# Patient Record
Sex: FEMALE | Race: WHITE
[De-identification: names, ages, dates, MRNs, and addresses within clinical notes are randomized per-mention and may not be internally consistent; named-entity substitution may affect disease eponyms.]

---

## 2018-08-14 ENCOUNTER — HOSPITAL ENCOUNTER (OUTPATIENT)
Dept: HOSPITAL 95 - LAB | Age: 68
Discharge: HOME | End: 2018-08-14
Attending: OBSTETRICS & GYNECOLOGY
Payer: MEDICARE

## 2018-08-14 DIAGNOSIS — Z91.89: Primary | ICD-10-CM

## 2018-08-14 PROCEDURE — G0123 SCREEN CERV/VAG THIN LAYER: HCPCS

## 2018-08-16 LAB — OTHER STN SPEC: (no result)

## 2020-10-19 NOTE — NUR
10/19/20 1139 Tram Morel
IV INFILTRATED IN OR, [facility]GIS[facility]

## 2021-08-04 NOTE — NUR
08/04/21 0831 Tram Morel
TETRACAINE AND PLEDGET PLACED IN RIGHT EYE BY [facility].G.

## 2021-08-12 NOTE — NUR
08/12/21 0753 Tram Morel
TETRACAINE DROP AND PLEDGET PLACED IN LEFT EYE BY [facility].AZA.